# Patient Record
Sex: MALE | Race: BLACK OR AFRICAN AMERICAN | NOT HISPANIC OR LATINO | Employment: PART TIME | URBAN - METROPOLITAN AREA
[De-identification: names, ages, dates, MRNs, and addresses within clinical notes are randomized per-mention and may not be internally consistent; named-entity substitution may affect disease eponyms.]

---

## 2017-01-23 ENCOUNTER — HOSPITAL ENCOUNTER (EMERGENCY)
Facility: OTHER | Age: 47
Discharge: HOME | End: 2017-01-23
Attending: EMERGENCY MEDICINE

## 2017-01-23 VITALS
HEIGHT: 74 IN | HEART RATE: 75 BPM | OXYGEN SATURATION: 100 % | TEMPERATURE: 98 F | BODY MASS INDEX: 28.23 KG/M2 | WEIGHT: 220 LBS | DIASTOLIC BLOOD PRESSURE: 107 MMHG | SYSTOLIC BLOOD PRESSURE: 177 MMHG | RESPIRATION RATE: 18 BRPM

## 2017-01-23 DIAGNOSIS — N49.2 SCROTAL WALL ABSCESS: Primary | ICD-10-CM

## 2017-01-23 PROCEDURE — 25000003 PHARM REV CODE 250: Performed by: EMERGENCY MEDICINE

## 2017-01-23 PROCEDURE — 55100 DRAINAGE OF SCROTUM ABSCESS: CPT

## 2017-01-23 PROCEDURE — 99283 EMERGENCY DEPT VISIT LOW MDM: CPT | Mod: 25

## 2017-01-23 RX ORDER — MUPIROCIN 20 MG/G
OINTMENT TOPICAL 3 TIMES DAILY
Qty: 30 G | Refills: 1 | Status: SHIPPED | OUTPATIENT
Start: 2017-01-23 | End: 2017-02-02

## 2017-01-23 RX ORDER — MUPIROCIN 20 MG/G
OINTMENT TOPICAL 3 TIMES DAILY
Qty: 30 G | Refills: 1 | Status: SHIPPED | OUTPATIENT
Start: 2017-01-23 | End: 2017-01-23

## 2017-01-23 RX ORDER — RIFAMPIN 300 MG/1
600 CAPSULE ORAL DAILY
Qty: 20 CAPSULE | Refills: 0 | Status: SHIPPED | OUTPATIENT
Start: 2017-01-23 | End: 2017-01-23

## 2017-01-23 RX ORDER — CLINDAMYCIN HYDROCHLORIDE 150 MG/1
300 CAPSULE ORAL 4 TIMES DAILY
Qty: 80 CAPSULE | Refills: 0 | Status: SHIPPED | OUTPATIENT
Start: 2017-01-23 | End: 2017-01-23

## 2017-01-23 RX ORDER — CHLORHEXIDINE GLUCONATE 40 MG/ML
SOLUTION TOPICAL DAILY PRN
Refills: 0 | COMMUNITY
Start: 2017-01-23 | End: 2019-06-27

## 2017-01-23 RX ORDER — CHLORHEXIDINE GLUCONATE 40 MG/ML
SOLUTION TOPICAL DAILY PRN
Refills: 0 | COMMUNITY
Start: 2017-01-23 | End: 2017-01-23

## 2017-01-23 RX ORDER — RIFAMPIN 300 MG/1
600 CAPSULE ORAL DAILY
Qty: 20 CAPSULE | Refills: 0 | Status: SHIPPED | OUTPATIENT
Start: 2017-01-23 | End: 2017-02-02

## 2017-01-23 RX ORDER — MUPIROCIN 20 MG/G
1 OINTMENT TOPICAL
Status: COMPLETED | OUTPATIENT
Start: 2017-01-23 | End: 2017-01-23

## 2017-01-23 RX ORDER — SULFAMETHOXAZOLE AND TRIMETHOPRIM 800; 160 MG/1; MG/1
1 TABLET ORAL 2 TIMES DAILY
Qty: 14 TABLET | Refills: 0 | Status: SHIPPED | OUTPATIENT
Start: 2017-01-23 | End: 2017-01-23

## 2017-01-23 RX ORDER — AMLODIPINE AND VALSARTAN 10; 160 MG/1; MG/1
1 TABLET ORAL DAILY
COMMUNITY

## 2017-01-23 RX ORDER — CLINDAMYCIN HYDROCHLORIDE 150 MG/1
300 CAPSULE ORAL 4 TIMES DAILY
Qty: 80 CAPSULE | Refills: 0 | Status: SHIPPED | OUTPATIENT
Start: 2017-01-23 | End: 2017-02-02

## 2017-01-23 RX ORDER — LIDOCAINE HYDROCHLORIDE 10 MG/ML
10 INJECTION INFILTRATION; PERINEURAL
Status: COMPLETED | OUTPATIENT
Start: 2017-01-23 | End: 2017-01-23

## 2017-01-23 RX ADMIN — LIDOCAINE HYDROCHLORIDE 10 ML: 10 INJECTION, SOLUTION INFILTRATION; PERINEURAL at 03:01

## 2017-01-23 RX ADMIN — MUPIROCIN 22 G: 20 OINTMENT TOPICAL at 03:01

## 2017-01-23 NOTE — ED PROVIDER NOTES
Encounter Date: 1/23/2017       History     Chief Complaint   Patient presents with    Abscess     pt presents to ER with c/o multiple abscess to legs and one large on his scrotum that had occurred in the past.       Review of patient's allergies indicates:   Allergen Reactions    Asa [aspirin] Hives     Patient is a 47 y.o. male presenting with the following complaint: abscess.   Abscess    This is a recurrent problem. The current episode started several weeks ago (~ 1 month ago). The problem occurs continuously. The problem has been gradually worsening. The abscess is present on the genitalia (left hemiscrotum). The abscess is characterized by painfulness and swelling. Pertinent negatives include no fever, no vomiting and no cough.   prescribed bactrim last month and completed a 10 day course with persistent and worsening pain and swelling to this area.   Last tetanus ~ 2 years ago.    Past Medical History   Diagnosis Date    Hypertension      No past medical history pertinent negatives.  History reviewed. No pertinent past surgical history.  History reviewed. No pertinent family history.  Social History   Substance Use Topics    Smoking status: Never Smoker    Smokeless tobacco: Never Used    Alcohol use Yes     Review of Systems   Constitutional: Negative for appetite change, chills, fever and unexpected weight change.   HENT: Negative.    Eyes: Negative.    Respiratory: Negative for cough, shortness of breath and stridor.    Cardiovascular: Negative for chest pain and palpitations.   Gastrointestinal: Negative for nausea and vomiting.   Genitourinary: Positive for scrotal swelling. Negative for decreased urine volume, discharge, dysuria, frequency, hematuria, penile pain, penile swelling and testicular pain.   Musculoskeletal: Negative.    Skin: Positive for wound (multiple recurrent skin abscesses all over body).   Neurological: Negative for dizziness and headaches.   Psychiatric/Behavioral: Negative.     All other systems reviewed and are negative.      Physical Exam   Initial Vitals   BP Pulse Resp Temp SpO2   01/23/17 0309 01/23/17 0309 01/23/17 0309 01/23/17 0309 --   167/99 78 18 97.7 °F (36.5 °C)      Physical Exam    Nursing note and vitals reviewed.  Constitutional: He appears well-developed and well-nourished. He is not diaphoretic. No distress.   HENT:   Head: Normocephalic and atraumatic.   Mouth/Throat: Oropharynx is clear and moist. No oropharyngeal exudate.   Eyes: EOM are normal. Pupils are equal, round, and reactive to light. No scleral icterus.   Neck: Normal range of motion. Neck supple.   Cardiovascular: Normal rate, regular rhythm and intact distal pulses.   No murmur heard.  Pulmonary/Chest: Breath sounds normal. No stridor. No respiratory distress. He has no wheezes. He has no rhonchi. He exhibits no tenderness.   Abdominal: Soft. Bowel sounds are normal. There is no tenderness.   Genitourinary:       Left testis shows swelling and tenderness.         Musculoskeletal: Normal range of motion. He exhibits no edema.   Neurological: He is alert and oriented to person, place, and time. He has normal strength.   Skin: Skin is warm. No pallor.   Psychiatric: He has a normal mood and affect.         ED Course   I & D - Incision and Drainage  Date/Time: 1/23/2017 4:13 AM  Location procedure was performed: [facility] EMERGENCY DEPARTMENT  Performed by: KRISTI LIZAMA  Authorized by: KRISTI LIZAMA   Consent Done: Not Needed  Type: abscess  Body area: anogenital  Location details: scrotal wall  Anesthesia: local infiltration    Anesthesia:  Anesthesia: local infiltration  Local Anesthetic: lidocaine 1% without epinephrine   Anesthetic total: 5 mL  Patient sedated: no  Scalpel size: 11  Incision type: single straight  Complexity: simple  Drainage: pus  Drainage amount: copious  Wound treatment: incision,  wound left open,  drainage and  expression of material  Packing material: 1/4 in iodoform  gauze  Complications: No  Patient tolerance: Patient tolerated the procedure well with no immediate complications        Labs Reviewed - No data to display          Medical Decision Making:   Initial Assessment:   47 y.o. Male with recurrent scrotal wall abscess to left hemiscrotum. Had I&D in same location ~ 1 year ago at [facility]. No clinical signs of Jorge's Gangrene.                     ED Course       Medications given in ED    Medications   lidocaine HCL 10 mg/ml (1%) injection 10 mL (10 mLs Intradermal Given by Other 1/23/17 7678)   mupirocin 2 % ointment 22 g (22 g Topical (Top) Given by Other 1/23/17 7356)       Discharge Medications     Medication List with Changes/Refills   New Medications    CHLORHEXIDINE (HIBICLENS) 4 % EXTERNAL LIQUID    Apply topically daily as needed.    CLINDAMYCIN (CLEOCIN) 150 MG CAPSULE    Take 2 capsules (300 mg total) by mouth 4 (four) times daily.    MUPIROCIN (BACTROBAN) 2 % OINTMENT    Apply topically 3 (three) times daily. Also place on cotton swab and put in each nostril twice daily for 5 days    RIFAMPIN (RIFADIN) 300 MG CAPSULE    Take 2 capsules (600 mg total) by mouth once daily.   Current Medications    AMLODIPINE-VALSARTAN (EXFORGE)  MG PER TABLET    Take 1 tablet by mouth once daily.   Discontinued Medications    HYDROCODONE-ACETAMINOPHEN 5-325MG (NORCO) 5-325 MG PER TABLET    Take 1 tablet by mouth every 4 (four) hours as needed for Pain.    LISINOPRIL 10 MG TABLET    Take 10 mg by mouth once daily.             Patient discharged to home in stable condition with instructions to:   1. Please take all meds as prescribed.  2. Follow-up with your primary care doctor  And Urology  3. Return precautions discussed and patient and/or family/caretaker understands to return to the emergency room for any concerns including worsening of your current symptoms, fever, chills, night sweats, worsening pain, chest pain, shortness of breath, nausea, vomiting,  diarrhea, bleeding, headache, difficulty talking, visual disturbances, weakness, numbness or any other acute concerns    Clinical Impression:   The encounter diagnosis was Scrotal wall abscess.          Geovany Sharp MD  01/23/17 1322

## 2017-01-23 NOTE — ED AVS SNAPSHOT
[facility] EMERGENCY DEPARTMENT  [address] [place] [address]               Dayan Thompson   2017  3:05 AM   ED    Description:  Male : 1970   Department:  [facility] Emergency Department           Your Care was Coordinated By:     Provider Role From To    Geovany Sharp MD Attending Provider 17 0307 --      Reason for Visit     Abscess           Diagnoses this Visit        Comments    Scrotal wall abscess    -  Primary       ED Disposition     ED Disposition Condition Comment    Discharge  Patient discharged to home in stable condition.              To Do List           Follow-up Information     Follow up with INGRIS Rosales MD. Call today.    Specialty:  Urology    Why:  to schedule an appointment, for re-evaluation of today's complaint, For wound re-check and packing change in two days.    Contact information:    [address] [place] [address] [address]  [facility] [address] [phone]         These Medications        Disp Refills Start End    mupirocin (BACTROBAN) 2 % ointment 30 g 1 2017    Apply topically 3 (three) times daily. Also place on cotton swab and put in each nostril twice daily for 5 days - Topical (Top)    rifAMpin (RIFADIN) 300 MG capsule 20 capsule 0 2017    Take 2 capsules (600 mg total) by mouth once daily. - Oral    clindamycin (CLEOCIN) 150 MG capsule 80 capsule 0 2017    Take 2 capsules (300 mg total) by mouth 4 (four) times daily. - Oral      PURCHASE these Medications (No prescription required)        Start End    chlorhexidine (HIBICLENS) 4 % external liquid 2017     Sig - Route: Apply topically daily as needed. - Topical (Top)    Class: OTC      [facility]     [facility] Care Line -  Assistance  Registered nurses in the [facility] provide clinical advisement, health education, appointment booking, and other advisory services.  Call for this free service at [phone].              Medications           Message regarding Medications     Verify the changes and/or additions to your medication regime listed below are the same as discussed with your clinician today.  If any of these changes or additions are incorrect, please notify your healthcare provider.        START taking these NEW medications        Refills    mupirocin (BACTROBAN) 2 % ointment 1    Sig: Apply topically 3 (three) times daily. Also place on cotton swab and put in each nostril twice daily for 5 days    Class: Print    Route: Topical (Top)    chlorhexidine (HIBICLENS) 4 % external liquid 0    Sig: Apply topically daily as needed.    Class: OTC    Route: Topical (Top)    rifAMpin (RIFADIN) 300 MG capsule 0    Sig: Take 2 capsules (600 mg total) by mouth once daily.    Class: Print    Route: Oral    clindamycin (CLEOCIN) 150 MG capsule 0    Sig: Take 2 capsules (300 mg total) by mouth 4 (four) times daily.    Class: Print    Route: Oral      These medications were administered today        Dose Freq    lidocaine HCL 10 mg/ml (1%) injection 10 mL 10 mL ED 1 Time    Sig: Inject 10 mLs into the skin ED 1 Time.    Class: Normal    Route: Intradermal    mupirocin 2 % ointment 22 g 1 Tube ED 1 Time    Sig: Apply 22 g topically ED 1 Time.    Class: Normal    Route: Topical (Top)      STOP taking these medications     hydrocodone-acetaminophen 5-325mg (NORCO) 5-325 mg per tablet Take 1 tablet by mouth every 4 (four) hours as needed for Pain.    lisinopril 10 MG tablet Take 10 mg by mouth once daily.           Verify that the below list of medications is an accurate representation of the medications you are currently taking.  If none reported, the list may be blank. If incorrect, please contact your healthcare provider. Carry this list with you in case of emergency.           Current Medications     amlodipine-valsartan (EXFORGE)  mg per tablet Take 1 tablet by mouth once daily.    chlorhexidine (HIBICLENS) 4 % external  "liquid Apply topically daily as needed.    clindamycin (CLEOCIN) 150 MG capsule Take 2 capsules (300 mg total) by mouth 4 (four) times daily.    mupirocin (BACTROBAN) 2 % ointment Apply topically 3 (three) times daily. Also place on cotton swab and put in each nostril twice daily for 5 days    rifAMpin (RIFADIN) 300 MG capsule Take 2 capsules (600 mg total) by mouth once daily.           Clinical Reference Information           Your Vitals Were     BP Pulse Temp Resp Height Weight    167/99 (BP Location: Left arm, Patient Position: Sitting) 78 97.7 °F (36.5 °C) (Oral) 18 6' 2" (1.88 m) 99.8 kg (220 lb)    BMI                28.25 kg/m2          Allergies as of 1/23/2017        Reactions    Asa [Aspirin] Hives      Immunizations Administered on Date of Encounter - 1/23/2017     None      ED Micro, Lab, POCT     None      ED Imaging Orders     None      Discharge References/Attachments     ABSCESS, INCISION AND DRAINAGE (ENGLISH)      [facility] Sign-Up     Activating your [facility] account is as easy as 1-2-3!     1) Visit [email or web address], select Sign Up Now, enter this activation code and your date of birth, then select Next.  Activation code not generated  Current Patient Portal Status: Account disabled      2) Create a username and password to use when you visit [facility] in the future and select a security question in case you lose your password and select Next.    3) Enter your e-mail address and click Sign Up!    Additional Information  If you have questions, please e-mail [email or web address] or call [phone] to talk to our [facility] staff. Remember, [facility] is NOT to be used for urgent needs. For medical emergencies, dial 911.          [facility] Emergency Department complies with applicable Federal civil rights laws and does not discriminate on the basis of race, color, national origin, age, disability, or sex.        Language Assistance Services     ATTENTION: Language assistance services are " available, free of charge. Please call [phone].      ATENCIÓN: Si habla español, tiene a boone disposición servicios gratuitos de asistencia lingüística. Llame al [phone].     CHÚ Ý: N?u b?n nói Ti?ng Vi?t, có các d?ch v? h? tr? ngôn ng? mi?n phí dành cho b?n. G?i s? [phone].

## 2017-01-24 ENCOUNTER — TELEPHONE (OUTPATIENT)
Dept: UROLOGY | Facility: CLINIC | Age: 47
End: 2017-01-24

## 2017-01-24 NOTE — TELEPHONE ENCOUNTER
----- Message from Paulina Covington sent at 1/23/2017 11:10 AM CST -----  Contact: sister - stefano rayray - [phone]      ----- Message -----     From: Karine Villalpando LPN     Sent: 1/23/2017  10:48 AM       To: Paulina Covington    This pt has never seen dr astudillo. If first available isnt good and no andrés available. You should talk to jah  ----- Message -----     From: Paulina Covington     Sent: 1/23/2017  10:27 AM       To: Hermelinda ROGERS Jr Staff    hermelinda - was in er - is asking for appt sooner than march - had a boil cut off of penis - please call sister - stefano rayray - [phone]

## 2019-06-27 ENCOUNTER — HOSPITAL ENCOUNTER (EMERGENCY)
Facility: HOSPITAL | Age: 49
Discharge: HOME | End: 2019-06-27
Attending: EMERGENCY MEDICINE

## 2019-06-27 VITALS
DIASTOLIC BLOOD PRESSURE: 77 MMHG | SYSTOLIC BLOOD PRESSURE: 154 MMHG | OXYGEN SATURATION: 100 % | TEMPERATURE: 98 F | BODY MASS INDEX: 28.23 KG/M2 | RESPIRATION RATE: 16 BRPM | HEART RATE: 86 BPM | HEIGHT: 74 IN | WEIGHT: 220 LBS

## 2019-06-27 DIAGNOSIS — R21 RASH: ICD-10-CM

## 2019-06-27 DIAGNOSIS — L08.9 PUSTULES DETERMINED BY EXAMINATION: Primary | ICD-10-CM

## 2019-06-27 PROCEDURE — 99283 EMERGENCY DEPT VISIT LOW MDM: CPT | Mod: ,,, | Performed by: EMERGENCY MEDICINE

## 2019-06-27 PROCEDURE — 99283 PR EMERGENCY DEPT VISIT,LEVEL III: ICD-10-PCS | Mod: ,,, | Performed by: EMERGENCY MEDICINE

## 2019-06-27 PROCEDURE — 99284 EMERGENCY DEPT VISIT MOD MDM: CPT

## 2019-06-27 RX ORDER — CHLORHEXIDINE GLUCONATE 40 MG/ML
SOLUTION TOPICAL DAILY PRN
Qty: 236 ML | Refills: 0 | Status: SHIPPED | OUTPATIENT
Start: 2019-06-27

## 2019-06-27 RX ORDER — SULFAMETHOXAZOLE AND TRIMETHOPRIM 800; 160 MG/1; MG/1
1 TABLET ORAL 2 TIMES DAILY
Qty: 14 TABLET | Refills: 0 | Status: SHIPPED | OUTPATIENT
Start: 2019-06-27 | End: 2019-07-04

## 2019-06-27 NOTE — DISCHARGE INSTRUCTIONS
It appears you have a minor skin infection likely from Staph bacteria.   You were prescribed a course of antibiotics and skin wash.  Please apply the skin wash as directed.    Your blood pressure was mildly elevated today.  Please continue to take your blood pressure medications.    If you develop any fevers, large abscesses/boil, feel generally ill, generalized weakness, or any other new, worsening or worrisome symptoms please return immediately to the emergency department for re-evaluation.    Otherwise if your symptoms are not completely resolved, and continue to have minor symptoms please follow-up with your primary care doctor within 1 week.

## 2019-06-27 NOTE — ED PROVIDER NOTES
"Encounter Date: 6/27/2019    SCRIBE #1 NOTE: I, Ely Toure, am scribing for, and in the presence of,  Dr. Albarado. I have scribed the entire note.       History     Chief Complaint   Patient presents with    Recurrent Skin Infections     Pt with "boils" to head, leg and back     Time patient was seen by the provider: 11:19 AM      The patient is a 49 y.o. male with co-morbidities including: HTN who presents to the ED with a complaint of recurrent skin "boils" that have been appearing off-and-on since 2007. Explains that his current outbreak is mainly located on the back of his head, legs, and bottom. States that he can occasionally pop his "boils" and extract puss. He occasionally goes through months of no outbreak. Denies fever, V/D, CP, trouble breathing, abd pain, leg pain or swelling. Denies having lesions on his penis apart from one isolated time. Has taken Bactrim with relief in the past.  He states he has had STD testing for syphilis in the past, was negative.      The history is provided by the patient and medical records.     Review of patient's allergies indicates:   Allergen Reactions    Asa [aspirin] Hives     Past Medical History:   Diagnosis Date    Hypertension      History reviewed. No pertinent surgical history.  No family history on file.  Social History     Tobacco Use    Smoking status: Never Smoker    Smokeless tobacco: Never Used   Substance Use Topics    Alcohol use: Yes    Drug use: No     Review of Systems  Constitutional:  No Fever, No Chills,   Eyes: No Vision Changes  ENT/Mouth: No sore throat, No rhinorrhea  Cardiovascular:  No Chest Pain, No Palpitations  Respiratory:  No Cough, No SOB  Gastrointestinal:  No Nausea, No Vomiting, No Diarrhea, No abdo pain.  Genitourinary:  No  pain, No dysuria   Musculoskeletal:  No Arthralgias, No Back Pain, No Neck Pain, No recent trauma.  Skin:  Multiple small purulent pustules  Neuro:  No Weakness, No Numbness, No Paresthesias, No " Dizziness, No Headache      Physical Exam     Initial Vitals [06/27/19 1023]   BP Pulse Resp Temp SpO2   (!) 154/77 86 16 98.2 °F (36.8 °C) 100 %      MAP       --         Physical Exam    Nursing note and vitals reviewed.      Physical Exam:  GENERAL APPEARANCE: Well developed, well nourished, in no acute distress.  HENT: Normocephalic, atraumatic    EYES: Sclerae anicteric   NECK: Supple  LUNGS: Breathing comfortably. Speaking in full sentences   NEUROLOGIC: Alert, interacting normally. No facial droop.   MSK: Moving all four extremities.  Skin: Scattered pustules of various stages on his legs, neck, and arms. No large obvious abscesses. Some have unroofed and are healing. Warm and dry. No visible rash on exposed areas of skin.    Psych: Mood and affect normal.       ED Course   Procedures  Labs Reviewed - No data to display       Imaging Results    None          Medical Decision Making:   History:   Old Medical Records: I decided to obtain old medical records.  Old Records Summarized: records from clinic visits and records from previous admission(s).  Initial Assessment:   Scattered pustules patient states he has had this for greater than 20 years.  Has been seen for abscess/folliculitis in the past in the emergency department.  No large single abscess drained. Patient states that he has had STD testing in the past and is negative for Syphilis. In the past states that oral abx help in improving the Sx, specifically mentions Bactrim. He is systematically well, and there is no signs of dangerous general infection. Will treat with Bactrim x7 days and Hibiclens solution and regular PMD follow up for further care.      BP noted, patient is to continue to take their prescription BP meds and follow up with PMD for continued titration of BP medications for blood pressure optimization.     MDM Complexity Points:   Problem Points:  1.New problem, with no additional ED work-up planned (maximum of 1) - rash   2.Self-limited  or minor (maximum of 2) - elevated blood pressure    Data Points:  Decision to obtain old records (in the EHR) and Review and summarization of old records              Scribe Attestation:   Scribe #1: I performed the above scribed service and the documentation accurately describes the services I performed. I attest to the accuracy of the note.               Clinical Impression:       ICD-10-CM ICD-9-CM   1. Pustules determined by examination L08.9 686.9   2. Rash R21 782.1         Disposition:   Disposition: Discharged  Condition: Stable                        Elieser Albarado MD  06/27/19 2011

## 2019-06-27 NOTE — ED TRIAGE NOTES
Patient came to the ED for having have increased pustules located throughout the body.  Patient has pustules located on upper thighs, head, buttocks, and abdomen.

## 2019-06-27 NOTE — ED NOTES
Dayan Thompson, a 49 y.o. male presents to the ED via personal transportation with CC multiple skin pustulates located on head, back, legs, and abdomen.  Patient staets this is chronic and he has suffered for the last 20 years with this.         Patient identifiers verified verbally with patient  and correct for Dayan Thompson.    LOC/ APPEARANCE: The patient is AAOx4. Pt is speaking appropriately, no slurred speech. Pt changed into hospital gown.   SKIN: Skin is warm dry, noted skin pustules on multiple areas of the body. Capillary refill <3 seconds. No breakdown or brusing visible. Mucus membranes moist, acyanotic.  RESPIRATORY: Airway is open and patent. Respirations-spontaneous, unlabored, regular rate, equal bilaterally on inspiration and expiration. No accessory muscle use noted. Lungs clear to auscultation in all fields bilaterally anterior and posterior.   CARDIAC: Patient has regular heart rate.  No peripheral edema noted, and patient has no c/o chest pain. Peripheral pulses present equal and strong throughout.  ABDOMEN: Soft and non-tender to palpation with no distention noted.   NEUROLOGIC: Eyes open spontaneously and facial expression symmetrical. Pt behavior appropriate to situation, and pt follows commands.   MUSCULOSKELETAL: Spontaneous movement noted to all extremities.  : No complaints of frequency, burning, urgency or blood in the urine. No complaints of incontinence.

## 2025-04-13 ENCOUNTER — HOSPITAL ENCOUNTER (EMERGENCY)
Facility: OTHER | Age: 55
Discharge: HOME | End: 2025-04-13
Attending: EMERGENCY MEDICINE

## 2025-04-13 VITALS
WEIGHT: 220 LBS | HEIGHT: 74 IN | DIASTOLIC BLOOD PRESSURE: 106 MMHG | SYSTOLIC BLOOD PRESSURE: 171 MMHG | HEART RATE: 84 BPM | OXYGEN SATURATION: 98 % | RESPIRATION RATE: 16 BRPM | BODY MASS INDEX: 28.23 KG/M2 | TEMPERATURE: 98 F

## 2025-04-13 DIAGNOSIS — I10 HYPERTENSION, POOR CONTROL: Primary | ICD-10-CM

## 2025-04-13 DIAGNOSIS — M19.012 ARTHRITIS OF LEFT SHOULDER: ICD-10-CM

## 2025-04-13 PROCEDURE — 99284 EMERGENCY DEPT VISIT MOD MDM: CPT

## 2025-04-13 RX ORDER — MELOXICAM 15 MG/1
15 TABLET ORAL DAILY PRN
Qty: 30 TABLET | Refills: 0 | Status: SHIPPED | OUTPATIENT
Start: 2025-04-13

## 2025-04-13 RX ORDER — AMLODIPINE AND VALSARTAN 10; 160 MG/1; MG/1
1 TABLET ORAL DAILY
Qty: 30 TABLET | Refills: 0 | Status: SHIPPED | OUTPATIENT
Start: 2025-04-13

## 2025-04-14 NOTE — ED PROVIDER NOTES
"Chief complaint:  Shoulder Pain (Non traumatic L shoulder discomfort over the past 2 months./"It might be arthritis but Neena never been evaluated for it."////Remains to have good PMS & ROM/Pt is able to lift his L arm up above his head)      Source of information:  Patient, old chart    HPI:  Dayan Thompson is a 55 y.o. male presenting with primary complaint of left shoulder pain for the past 2 months.  No fall or injury and he does not recall any inciting event or activity.  States he sleeps on that shoulder at night frequently and sometimes worse upon waking.  Sometimes feels better if he stretches the shoulder.  No weakness or numbness of the hand.  No other musculoskeletal complaints.  Has not tried any over-the-counter medications for this.  Also reports he has not had blood pressure medication in several months, states that he used to take Exforge which worked well for him.  He is leaving in a couple weeks to go for a prolonged job in [place] which prompted him to come for evaluation today.  Does not currently have a primary care provider.  No other medications which he is supposed to be taking.  Does not smoke cigarettes.  Does use marijuana.  No other drugs.  No other acute complaints    ROS: As per HPI    Review of patient's allergies indicates:   Allergen Reactions    Asa [aspirin] Hives       Medications Ordered Prior to Encounter[1]    PMH:  As per HPI and below:  Past Medical History:   Diagnosis Date    Hypertension      No past surgical history on file.      Physical Exam:    Vitals:    04/13/25 2345   BP: (!) 171/106   Pulse: 84   Resp: 16   Temp:        General: No acute distress. Well developed. Well nourished.  Eyes: PERRL. EOM intact. no photophobia, no nystagmus  Conjunctivae - no pallor or icterus.   ENT: HEAD: Normal - atraumatic. Normal external ears. Normal nose.  No facial asymmetry. Mucous membranes - moist.  Neck: Neck supple. no meningismus. No cervical lymphadenopathy.  No JVD.  Cardiac: " Regular rate and rhythm, normal S1 and S2.  No pitting edema.    Respiratory: Clear to auscultation bilaterally without basilar rales.  Musculoskeletal:  Normal weight-bearing and gait No deformities. Normal ROM x4 including right shoulder.  He does have pain at limits of range of motion especially with abduction and internal/external rotation.  No focal bony tenderness.  Intact distal strength and sensation.    Integument: No acute skin rashes. No clubbing or cyanosis  Neurologic: No gross neurological deficits.   Psychiatric: Awake, alert.  Oriented x3.  Normal speech and mentation.        Labs Reviewed - No data to display    Medications - No data to display    Medical Decision Making  Differential diagnosis includes arthritis, shoulder sprain, poorly controlled hypertension    Amount and/or Complexity of Data Reviewed  External Data Reviewed: notes.    Risk  Prescription drug management.  Diagnosis or treatment significantly limited by social determinants of health.          MDM:    55 y.o. male with atraumatic left shoulder pain.  Good range of motion.  No focal bony tenderness.  Neurovascularly intact.  No indication for emergent imaging.  Suspect arthritis.  Encouraged range of motion exercises, will start on anti-inflammatory.  Also requesting blood pressure refill.  Blood pressure currently elevated but he is asymptomatic.  Restart the exforge as he reports that worked well for him previously.  Encouraged follow up at the [facility] as it is [facility] his house.  Encouraged medication compliance.  Stable for discharge    Medications - No data to display    ASSESSMENT:   1. Hypertension, poor control    2. Arthritis of left shoulder               [1]   No current facility-administered medications on file prior to encounter.     Current Outpatient Medications on File Prior to Encounter   Medication Sig Dispense Refill    chlorhexidine (HIBICLENS) 4 % external liquid Apply topically daily as needed. Use as  directed, external application only. 236 mL 0        Nick Farah II, MD  04/14/25 0021